# Patient Record
Sex: FEMALE | Race: BLACK OR AFRICAN AMERICAN | NOT HISPANIC OR LATINO | URBAN - NONMETROPOLITAN AREA
[De-identification: names, ages, dates, MRNs, and addresses within clinical notes are randomized per-mention and may not be internally consistent; named-entity substitution may affect disease eponyms.]

---

## 2017-01-05 NOTE — PATIENT DISCUSSION
Lattice Degeneration Counseling: I have discussed the diagnosis and pathophysiology with the patient and the possibility of a retinal tear or detachment. The signs/symptoms of a retinal tear/detachment were reviewed to include but not limited to redness, discharge, pain, increase or change in flashes of light, increase or change in floaters, decreased vision, part of the vision missing, veils or any other ocular concerns. I have further explained not all patients who develop a tear or detachment have notable symptoms, therefore, compliance with return visits are necessary. The patient was instructed to contact us immediately if they notice any of the signs or symptoms of retinal detachment as noted above as the prognosis for any potential treatment options may be time related. Return for follow-up as scheduled.

## 2017-01-05 NOTE — PATIENT DISCUSSION
Posterior Capsular Fibrosis Counseling: The diagnosis of posterior capsular fibrosis (PCF), also referred to as a secondary cataract or posterior capsular opacification (PCO), was discussed with the patient. The patient understands that their symptoms and limitations are likely related to this condition. I have reviewed the risks, benefits and alternatives of YAG laser surgery for the treatment of the fibrosis. The uncommon risk of an increase in intraocular pressure or a retinal detachment and their associated symptoms were explained to the patient. The patient understands and desires to proceed with the laser surgery to improve their vision.

## 2017-01-05 NOTE — PATIENT DISCUSSION
**MILD TO MODERATE DRY EYE, OU: PRESCRIBED ARTIFICIAL TEARS 2-3 X A DAY OU. RECOMMENDS OMEGA-3 FISH OIL WITH PRIMARY CARE PHYSICIANS APPROVAL. RETURN FOR FOLLOW-UP AS SCHEDULED OR SOONER IF SYMPTOMS WORSEN.

## 2017-01-05 NOTE — PATIENT DISCUSSION
Posterior Vitreous Detachment with Floaters Counseling: I have discussed the diagnosis of PVD with the patient and the possibility of a retinal tear or detachment. The signs/symptoms of a retinal tear/detachment were reviewed to include but not limited to redness, discharge, pain, increase or change in flashes of light, increase or change in floaters, decreased vision, part of the vision missing, or veils. I have further explained not all patients who develop a tear or detachment have notable symptoms, therefore, compliance with return visits are necessary. The patient was instructed to contact us immediately if they noticed any of the signs or symptoms of retinal detachment as noted above as the prognosis for any potential treatment options may be time limited. Return for follow-up as scheduled.

## 2017-01-05 NOTE — PATIENT DISCUSSION
POSTERIOR VITREOUS DETACHMENT WITH VITREOUS FLOATERS, OU: NO HOLES OR NO TEARS 360' WITH INDIRECT EXAM, OU. F&amp;F PRECAUTIONS REVIEWED WITH THE PATIENT. RETURN  AS SCHEDULED.

## 2017-01-05 NOTE — PATIENT DISCUSSION
LATTICE DEGENERATION, OD:  NO HOLES, NO TEARS 360 DEGREES WITH INDIRECT EXAM.  RETURN FOR FOLLOW-UP AS SCHEDULED.

## 2017-02-02 NOTE — PATIENT DISCUSSION
New Prescription: Restasis (cyclosporine): dropperette: 0.05% 1 drop twice a day as directed into both eyes 02-

## 2017-02-02 NOTE — PATIENT DISCUSSION
KERATOCONJUNCTIVITIS SICCA, OU: ARTIFICIAL TEARS NO LONGER PROVIDING RELIEF OF SYMPTOMS. PRESCRIBED RESTASIS BID OU ([place] CARD PROVIDED) AND RECOMMENDED THE DAILY INTAKE OF OMEGA 3 FATTY ACIDS (DRY EYE VITAMIN SHEET PROVIDED). PATIENT ADVISED TO ALLOW 60-90 DAYS FOR THERAPY TO START WORKING. RETURN FOR FOLLOW UP IF SYMPTOMS WORSEN OR DO NOT IMPROVE.

## 2017-10-31 NOTE — PROCEDURE NOTE: CLINICAL
PROCEDURE NOTE: YAG Capsulotomy OD. Diagnosis: Visually Significant PCO. Anesthesia: Topical. Prior to treatment, the risks/benefits/alternatives were discussed. The patient wished to proceed with procedure. Power = 2.8 mJ. Number of pulses = 13. Patient tolerated procedure well. There were no complications. 1 gtt of Alphagan was instilled after laser. Post laser IOP = 15 mmHg. Post-procedure instructions given. Maulik Baker

## 2018-08-07 PROBLEM — Z96.1: Noted: 2018-08-07

## 2018-08-07 PROBLEM — H52.4: Noted: 2018-08-07

## 2018-08-07 PROBLEM — E11.9: Noted: 2018-08-07

## 2018-12-05 NOTE — PATIENT DISCUSSION
MUCOUS FISHING SYNDROME, OU: PRESCRIBE OLOPATADINE BID OU AND REFRESH TEARS QID OU. MAY RESUME USE OF RESTASIS. STRESSED IMPORTANT OF AVOIDING RUBBING EYES AND REMOVING MUCOUS FROM EYES AS THIS IS MAKING IT WORSE. RETURN FOR FOLLOW UP AS SCHEDULED OR SOONER AS NEEDED.

## 2019-08-06 NOTE — PATIENT DISCUSSION
HYPERTENSIVE RETINOPATHY, OU: MILD OU. REC PT CONTINUE TO MONITOR B/P CLOSELY. CONTINUE WITH APPOINTMENTS AS SCHEDULED.

## 2019-08-06 NOTE — PATIENT DISCUSSION
POSTERIOR VITREOUS DETACHMENT, OU: STABLE OU. NO HOLES OR TEARS 360' WITH INDIRECT EXAM, OU. RETURN FOR FOLLOW-UP AS SCHEDULED.

## 2019-08-06 NOTE — PATIENT DISCUSSION
CONJUNCTIVITIS (ALLERGIC), OU:  PRESCRIBE OLOPATADINE BID OU AS NEEDED FOR RELIEF OF ITCHING. RETURN FOR FOLLOW-UP AS SCHEDULED.

## 2019-08-06 NOTE — PATIENT DISCUSSION
Hypertensive Retinopathy Counseling:  I have discussed the diagnosis and its pathophysiology with the patient. I have further explained the need for evaluation of underlying medical disorders in conjunction with the patient's primary care doctor. Serious complications can occur including the formation of new abnormal blood vessels, retinal swelling, and elevated eye pressure. Return for follow-up as scheduled or sooner if any change in symptoms.

## 2019-10-09 ENCOUNTER — IMPORTED ENCOUNTER (OUTPATIENT)
Dept: URBAN - NONMETROPOLITAN AREA CLINIC 1 | Facility: CLINIC | Age: 74
End: 2019-10-09

## 2019-10-09 PROCEDURE — 92014 COMPRE OPH EXAM EST PT 1/>: CPT

## 2019-10-09 PROCEDURE — 92015 DETERMINE REFRACTIVE STATE: CPT

## 2019-10-09 NOTE — PATIENT DISCUSSION
Presbyopia OUDiscussed refractive status in detail with patientNew glasses Rx given todayContinue to monitorPseudophakia OUBoth intraocular implants in place and stableContinue to monitorNIDDM sans Retinopathy Discussed diagnosis with patientDiscussed the risk of diabetic damage of the retina with potential vision loss and the importance of routine follow-upEmphasized strict blood sugar controlContinue to monitor

## 2019-11-14 NOTE — PROCEDURE NOTE: CLINICAL
PROCEDURE NOTE: Botox Injection, Face OU. Diagnosis: Rhytids, Crowsfeet/Glabella/Forehead. Prior to treatment, the risks/benefits/alternatives were discussed. The patient wished to proceed with procedure. Refer to template for location and amounts of injections. Botox was injected at each site without complications. Lot #: null. [id number]   Expiration Date: . EXP.4/2022 Total Units Used: 40 . Inventory Id: null. Total Units Wasted: 60. Patient tolerated procedure well. There were no complications. Post procedure instructions given. Lucinda Rodriguez PROCEDURE NOTE: Juvederm 2 Syringes Ultra Plus XC OU. Diagnosis: Rhytids, Crowsfeet/Glabella/Forehead. 2 cc's used to treat NLF today. lot # [id number] Exp 1/23/2020.

## 2019-11-14 NOTE — PATIENT DISCUSSION
Recommend 2-3 syringes of  Juvederm Voluma to start.  $750.00 per syringe currently [employer] half off.  (discussed risks and benefits. ..). By treating the cheeks it will soften NLF's.  Discussed realistic expectations.  This is a process and takes time to get desired results.    Photos taken.  Patient wants to wait to treat cheeks as she is still losing weight.

## 2019-11-14 NOTE — PATIENT DISCUSSION
Recommend 25-30 units in glabella and 10 units in forehead of Botox. Patient elects Botox injection.  40 units used, 60 units discarded, . (discussed risks and benefits of BOTOX. ..).  Patient desires to proceed.  Consent form signed.  Photos taken. *[place] risks and benefits and outcomes of treatment of muscles with the use  of botox.   Filler is not typically used in the forehead.  Consider full face laser.

## 2019-11-14 NOTE — PROCEDURE NOTE: CLINICAL
PROCEDURE NOTE: Botox Injection, Face OU. Diagnosis: Rhytids, Crowsfeet/Glabella/Forehead. Prior to treatment, the risks/benefits/alternatives were discussed. The patient wished to proceed with procedure. Refer to template for location and amounts of injections. Botox was injected at each site without complications. Lot #: null. [id number]   Expiration Date: . EXP.4/2022 Total Units Used: 40 . Inventory Id: null. Total Units Wasted: 60. Patient tolerated procedure well. There were no complications. Post procedure instructions given. Lucinda Rodriguez PROCEDURE NOTE: Juvederm 2 Syringes Ultra Plus XC OU. Diagnosis: Rhytids, Crowsfeet/Glabella/Forehead. 2 cc's used to treat NLF today. lot # [id number] Exp 1/23/2020.

## 2019-11-14 NOTE — PATIENT DISCUSSION
Recommend 2cc's to start of Juvederm Ultra Plus (discussed risks and benefits. ..).  split between NLF and MLs. Patient desires to proceed.  Photos taken. Consent form signed.

## 2019-11-14 NOTE — PATIENT DISCUSSION
Discussed will need additional syringes. Also discussed importance of treating cheeks to help provide bony support to improve NLF/ML.

## 2020-01-22 NOTE — PATIENT DISCUSSION
Recommend 1cc's of Juvederm Ultra Plus (discussed risks and benefits. ..).  split between NLF and MLs. Patient desires to proceed.  Photos taken. Consent form signed. (s/p 2cc Ultra plus XC 11/14/19). Discussed will likely need more.

## 2020-01-22 NOTE — PATIENT DISCUSSION
Discussed importance of treating cheeks to help provide bony support to improve NLF/ML. Pt declines.

## 2020-01-22 NOTE — PATIENT DISCUSSION
Recommend 2-3 syringes of  Juvederm Voluma to start.  By treating the cheeks it will soften NLF's.  Discussed realistic expectations.  This is a process and takes time to get desired results.    Photos taken.  Patient wants to wait to treat cheeks as she is still losing weight.

## 2020-01-22 NOTE — PROCEDURE NOTE: CLINICAL
PROCEDURE NOTE: Muriel Prior to the treatment, the risks/benefits/alternatives were discussed. The patient wished to proceed with the procedure. Lot# [id number] * Expiration date: 10/2020 Total ml used: 1 SYRINGE Ultra plus * Refer to template for location and number of injections. Patient tolerated the procedure well. There were no complications, post procedure instructions were given. Lucinda Rodriguez

## 2020-09-21 NOTE — PATIENT DISCUSSION
REFRACTIVE ERROR OU: PRESCRIBED GLASSES. MONITOR. RECOMMENDED A/R COATING TO HELP WITH NIGHT DRIVING.

## 2021-07-14 ENCOUNTER — IMPORTED ENCOUNTER (OUTPATIENT)
Dept: URBAN - NONMETROPOLITAN AREA CLINIC 1 | Facility: CLINIC | Age: 76
End: 2021-07-14

## 2021-07-14 PROCEDURE — 92014 COMPRE OPH EXAM EST PT 1/>: CPT

## 2021-07-14 PROCEDURE — 92015 DETERMINE REFRACTIVE STATE: CPT

## 2022-03-01 NOTE — PATIENT DISCUSSION
Educated patient on findings, condition, and affect on vision. Prescribe artificial tears QID/prn OU and warm compresses QD/prn OU. Discussed importance of good visual hygiene including conscious blinking. Advised to call/RTC if si/sx persist or worsen. Monitor.

## 2022-04-10 ASSESSMENT — VISUAL ACUITY
OS_SC: 20/20
OD_SC: 20/40+
OD_SC: 20/20
OS_SC: 20/40

## 2022-04-10 ASSESSMENT — TONOMETRY
OD_IOP_MMHG: 16
OS_IOP_MMHG: 16
OS_IOP_MMHG: 16
OD_IOP_MMHG: 15

## 2022-06-08 NOTE — PATIENT DISCUSSION
Educated patient on findings. Ocular health WNL on thorough examination today; no visible plaques/infarcts. Due to described symptoms and +HTN with history of HTN retinopathy order next available HVF 30-2 OU to assess further. Advised to call/RTC if si/worsen.

## 2022-07-19 ENCOUNTER — ESTABLISHED PATIENT (OUTPATIENT)
Dept: RURAL CLINIC 3 | Facility: CLINIC | Age: 77
End: 2022-07-19

## 2022-07-19 DIAGNOSIS — H52.4: ICD-10-CM

## 2022-07-19 PROCEDURE — 92015 DETERMINE REFRACTIVE STATE: CPT

## 2022-07-19 PROCEDURE — 92014 COMPRE OPH EXAM EST PT 1/>: CPT

## 2022-07-19 ASSESSMENT — TONOMETRY
OD_IOP_MMHG: 20
OS_IOP_MMHG: 20

## 2022-07-19 ASSESSMENT — VISUAL ACUITY
OD_CC: 20/25
OS_CC: 20/30-1

## 2022-10-07 NOTE — PATIENT DISCUSSION
HVF 30-2 today WNL OU. No evidence of vision loss/visual field defect to explain patient symptoms. Will consider re-evaluation/repeat testing if si/sx persist or worsen. Monitor.

## 2023-10-03 ENCOUNTER — ESTABLISHED PATIENT (OUTPATIENT)
Dept: RURAL CLINIC 3 | Facility: CLINIC | Age: 78
End: 2023-10-03

## 2023-10-03 DIAGNOSIS — H52.4: ICD-10-CM

## 2023-10-03 PROCEDURE — 92015 DETERMINE REFRACTIVE STATE: CPT

## 2023-10-03 PROCEDURE — 92014 COMPRE OPH EXAM EST PT 1/>: CPT

## 2023-10-03 ASSESSMENT — TONOMETRY
OS_IOP_MMHG: 18
OD_IOP_MMHG: 18

## 2023-10-03 ASSESSMENT — VISUAL ACUITY
OS_CC: 20/20
OD_CC: 20/25

## 2024-10-04 ENCOUNTER — COMPREHENSIVE EXAM (OUTPATIENT)
Dept: RURAL CLINIC 3 | Facility: CLINIC | Age: 79
End: 2024-10-04

## 2024-10-04 DIAGNOSIS — Z96.1: ICD-10-CM

## 2024-10-04 DIAGNOSIS — E11.9: ICD-10-CM

## 2024-10-04 DIAGNOSIS — H10.423: ICD-10-CM

## 2024-10-04 PROCEDURE — 92250 FUNDUS PHOTOGRAPHY W/I&R: CPT

## 2024-10-04 PROCEDURE — 99214 OFFICE O/P EST MOD 30 MIN: CPT

## 2025-02-05 ENCOUNTER — EMERGENCY VISIT (OUTPATIENT)
Age: 80
End: 2025-02-05

## 2025-02-05 DIAGNOSIS — H10.423: ICD-10-CM

## 2025-02-05 DIAGNOSIS — E11.9: ICD-10-CM

## 2025-02-05 PROCEDURE — 99213 OFFICE O/P EST LOW 20 MIN: CPT
